# Patient Record
Sex: FEMALE | URBAN - METROPOLITAN AREA
[De-identification: names, ages, dates, MRNs, and addresses within clinical notes are randomized per-mention and may not be internally consistent; named-entity substitution may affect disease eponyms.]

---

## 2019-10-04 ENCOUNTER — APPOINTMENT (RX ONLY)
Dept: URBAN - METROPOLITAN AREA CLINIC 38 | Facility: CLINIC | Age: 44
Setting detail: DERMATOLOGY
End: 2019-10-04

## 2019-10-04 DIAGNOSIS — L72.8 OTHER FOLLICULAR CYSTS OF THE SKIN AND SUBCUTANEOUS TISSUE: ICD-10-CM

## 2019-10-04 DIAGNOSIS — B35.3 TINEA PEDIS: ICD-10-CM

## 2019-10-04 DIAGNOSIS — L90.5 SCAR CONDITIONS AND FIBROSIS OF SKIN: ICD-10-CM

## 2019-10-04 PROBLEM — F32.9 MAJOR DEPRESSIVE DISORDER, SINGLE EPISODE, UNSPECIFIED: Status: ACTIVE | Noted: 2019-10-04

## 2019-10-04 PROCEDURE — 99202 OFFICE O/P NEW SF 15 MIN: CPT

## 2019-10-04 RX ORDER — OXICONAZOLE NITRATE 10 MG/G
CREAM TOPICAL
Qty: 1 | Refills: 1 | Status: ERX | COMMUNITY
Start: 2019-10-04

## 2019-10-04 RX ADMIN — OXICONAZOLE NITRATE: 10 CREAM TOPICAL at 17:02

## 2019-10-04 NOTE — HPI: BLISTERS
How Severe Are Your Blisters?: moderate
Is This A New Presentation, Or A Follow-Up?: Blisters
Additional History: Used triple antibiotic